# Patient Record
Sex: FEMALE | Race: WHITE | NOT HISPANIC OR LATINO | URBAN - METROPOLITAN AREA
[De-identification: names, ages, dates, MRNs, and addresses within clinical notes are randomized per-mention and may not be internally consistent; named-entity substitution may affect disease eponyms.]

---

## 2019-03-11 ENCOUNTER — INPATIENT HOSPITAL (OUTPATIENT)
Dept: URBAN - METROPOLITAN AREA HOSPITAL 93 | Facility: HOSPITAL | Age: 49
End: 2019-03-11
Payer: COMMERCIAL

## 2019-03-11 DIAGNOSIS — K25.0 ACUTE GASTRIC ULCER WITH HEMORRHAGE: ICD-10-CM

## 2019-03-11 DIAGNOSIS — D62 ACUTE POSTHEMORRHAGIC ANEMIA: ICD-10-CM

## 2019-03-11 DIAGNOSIS — K44.9 DIAPHRAGMATIC HERNIA WITHOUT OBSTRUCTION OR GANGRENE: ICD-10-CM

## 2019-03-11 DIAGNOSIS — K92.1 MELENA: ICD-10-CM

## 2019-03-11 DIAGNOSIS — K22.10 ULCER OF ESOPHAGUS WITHOUT BLEEDING: ICD-10-CM

## 2019-03-11 PROCEDURE — 99254 IP/OBS CNSLTJ NEW/EST MOD 60: CPT | Mod: 25 | Performed by: INTERNAL MEDICINE

## 2019-03-11 PROCEDURE — 43235 EGD DIAGNOSTIC BRUSH WASH: CPT | Performed by: INTERNAL MEDICINE

## 2019-03-12 PROCEDURE — 99232 SBSQ HOSP IP/OBS MODERATE 35: CPT | Performed by: INTERNAL MEDICINE

## 2020-01-16 ENCOUNTER — OFFICE (OUTPATIENT)
Dept: URBAN - METROPOLITAN AREA CLINIC 10 | Facility: CLINIC | Age: 50
End: 2020-01-16

## 2020-01-16 VITALS
HEART RATE: 97 BPM | DIASTOLIC BLOOD PRESSURE: 92 MMHG | SYSTOLIC BLOOD PRESSURE: 134 MMHG | WEIGHT: 191 LBS | HEIGHT: 61 IN

## 2020-01-16 DIAGNOSIS — R11.10 VOMITING, UNSPECIFIED: ICD-10-CM

## 2020-01-16 DIAGNOSIS — D50.9 IRON DEFICIENCY ANEMIA, UNSPECIFIED: ICD-10-CM

## 2020-01-16 DIAGNOSIS — K21.9 GASTRO-ESOPHAGEAL REFLUX DISEASE WITHOUT ESOPHAGITIS: ICD-10-CM

## 2020-01-16 PROCEDURE — 99214 OFFICE O/P EST MOD 30 MIN: CPT | Performed by: PHYSICIAN ASSISTANT

## 2020-01-16 RX ORDER — PANTOPRAZOLE SODIUM 40 MG/1
40 TABLET, DELAYED RELEASE ORAL
Refills: 0 | Status: COMPLETED
End: 2020-01-16 | Stop reason: ALTCHOICE

## 2020-01-16 RX ORDER — OMEPRAZOLE 40 MG/1
CAPSULE, DELAYED RELEASE ORAL
Qty: 90 | Refills: 3 | Status: ACTIVE

## 2020-01-16 NOTE — SERVICENOTES
[id number]fe-old with complaints of heartburn and reflux to the point of vomiting at night.  She denies unintentional weight loss or hematemesis.  She had a Kyle's lesion on EGD 1 year ago that was done for severe iron deficiency anemia.  Appears her hematocrit is now normal based on recent labs.  She has had a history of a lap band that was removed approximately 10-12 years ago for what sounds like complications of paraesophageal hernia. She has history of pyloric stenosis surgery as a child.  Agree with plan for omeprazole prior to 1st meal a day and prior to evening meal.  Would recommend consuming 4-6 small meals daily that are low in fat, not eating 2 hours before lying down, and considering using wedge-shaped bili to keep head of bed elevated.

## 2020-01-16 NOTE — SERVICEHPINOTES
Bianca Stockton   is a  50   year old  female  who is seen here today for a first visit. She is seen in consultation for  Yuni Ray  .    She is here today with c/o hiatal hernia, reflux, and vomiting. She notes past hx of severe reflux, iron deficiency anemia, and bleeding gastric ulcer. She was in the hospital early 2019 for anemia and received 2U PRBCs. She had EGD 3/2019 which showed Kyle ulcer and sliding hiatal hernia, otherwise normal. Labs reviewed from PCP, there's no current anemia last labs and iron studies were normal. She has been on pantoprazole for the past 2-3 months and sometimes takes Prilosec OTC with it with some improvement. She says her Dad had hiatal hernia with similar sxs and had surgery to repair it. She notes burning throat and some epigastric discomfort at times that comes and goes. She says that few nights per week she will wake up in the middle of the night and have to vomit "just liquid acid" of moderate amount. Denies any bloody emesis. She has not lost weight abnormally. No BM changes or blood in stool. She had colonoscopy in 2010.BR10/2019: Hematocrit 46.99/2019: Hematocrit 44.306/20/2019: CBC, serum iron, TIBC, and ferritin reported as [id number]/11/2019: EGD-sliding hiatal hernia Kyle ulcer the level of diaphragmatic esophagus stomach mucosa duodenal bulb and 2nd part of duodenum.4/16/2010: Colonoscopy and EGDBRColonoscopy-grade 1 internal hemorrhoidsBREGD-hyperplastic polyp of duodenumBR

## 2020-01-20 ENCOUNTER — AMBULATORY SURGICAL CENTER (OUTPATIENT)
Dept: URBAN - METROPOLITAN AREA SURGERY 1 | Facility: SURGERY | Age: 50
End: 2020-01-20
Payer: COMMERCIAL

## 2020-01-20 ENCOUNTER — AMBULATORY SURGICAL CENTER (OUTPATIENT)
Dept: URBAN - METROPOLITAN AREA SURGERY 1 | Facility: SURGERY | Age: 50
End: 2020-01-20

## 2020-01-20 ENCOUNTER — OFFICE (OUTPATIENT)
Dept: URBAN - METROPOLITAN AREA PATHOLOGY 22 | Facility: PATHOLOGY | Age: 50
End: 2020-01-20
Payer: COMMERCIAL

## 2020-01-20 VITALS
WEIGHT: 184 LBS | DIASTOLIC BLOOD PRESSURE: 55 MMHG | DIASTOLIC BLOOD PRESSURE: 59 MMHG | SYSTOLIC BLOOD PRESSURE: 95 MMHG | HEART RATE: 81 BPM | HEART RATE: 76 BPM | HEART RATE: 90 BPM | HEART RATE: 90 BPM | HEART RATE: 80 BPM | TEMPERATURE: 97.9 F | SYSTOLIC BLOOD PRESSURE: 130 MMHG | HEART RATE: 90 BPM | OXYGEN SATURATION: 100 % | SYSTOLIC BLOOD PRESSURE: 130 MMHG | SYSTOLIC BLOOD PRESSURE: 105 MMHG | DIASTOLIC BLOOD PRESSURE: 81 MMHG | TEMPERATURE: 99.6 F | DIASTOLIC BLOOD PRESSURE: 63 MMHG | SYSTOLIC BLOOD PRESSURE: 130 MMHG | OXYGEN SATURATION: 99 % | SYSTOLIC BLOOD PRESSURE: 105 MMHG | TEMPERATURE: 99.6 F | HEIGHT: 61 IN | DIASTOLIC BLOOD PRESSURE: 54 MMHG | HEART RATE: 85 BPM | OXYGEN SATURATION: 99 % | OXYGEN SATURATION: 99 % | DIASTOLIC BLOOD PRESSURE: 55 MMHG | RESPIRATION RATE: 16 BRPM | RESPIRATION RATE: 18 BRPM | SYSTOLIC BLOOD PRESSURE: 108 MMHG | HEART RATE: 76 BPM | RESPIRATION RATE: 18 BRPM | HEART RATE: 80 BPM | HEART RATE: 81 BPM | DIASTOLIC BLOOD PRESSURE: 55 MMHG | SYSTOLIC BLOOD PRESSURE: 99 MMHG | DIASTOLIC BLOOD PRESSURE: 59 MMHG | DIASTOLIC BLOOD PRESSURE: 54 MMHG | RESPIRATION RATE: 16 BRPM | SYSTOLIC BLOOD PRESSURE: 95 MMHG | RESPIRATION RATE: 18 BRPM | SYSTOLIC BLOOD PRESSURE: 95 MMHG | HEART RATE: 76 BPM | WEIGHT: 184 LBS | HEART RATE: 85 BPM | DIASTOLIC BLOOD PRESSURE: 63 MMHG | WEIGHT: 184 LBS | SYSTOLIC BLOOD PRESSURE: 105 MMHG | HEART RATE: 85 BPM | RESPIRATION RATE: 16 BRPM | DIASTOLIC BLOOD PRESSURE: 81 MMHG | TEMPERATURE: 97.9 F | TEMPERATURE: 97.9 F | DIASTOLIC BLOOD PRESSURE: 74 MMHG | DIASTOLIC BLOOD PRESSURE: 81 MMHG | HEIGHT: 61 IN | OXYGEN SATURATION: 100 % | SYSTOLIC BLOOD PRESSURE: 99 MMHG | SYSTOLIC BLOOD PRESSURE: 108 MMHG | DIASTOLIC BLOOD PRESSURE: 74 MMHG | TEMPERATURE: 99.6 F | HEART RATE: 80 BPM | DIASTOLIC BLOOD PRESSURE: 54 MMHG | DIASTOLIC BLOOD PRESSURE: 74 MMHG | SYSTOLIC BLOOD PRESSURE: 108 MMHG | DIASTOLIC BLOOD PRESSURE: 63 MMHG | SYSTOLIC BLOOD PRESSURE: 99 MMHG | OXYGEN SATURATION: 100 % | DIASTOLIC BLOOD PRESSURE: 59 MMHG | HEART RATE: 81 BPM | HEIGHT: 61 IN

## 2020-01-20 DIAGNOSIS — R11.10 VOMITING, UNSPECIFIED: ICD-10-CM

## 2020-01-20 DIAGNOSIS — K21.9 GASTRO-ESOPHAGEAL REFLUX DISEASE WITHOUT ESOPHAGITIS: ICD-10-CM

## 2020-01-20 DIAGNOSIS — K63.5 POLYP OF COLON: ICD-10-CM

## 2020-01-20 DIAGNOSIS — K64.1 SECOND DEGREE HEMORRHOIDS: ICD-10-CM

## 2020-01-20 DIAGNOSIS — Z12.11 ENCOUNTER FOR SCREENING FOR MALIGNANT NEOPLASM OF COLON: ICD-10-CM

## 2020-01-20 DIAGNOSIS — I10 ESSENTIAL (PRIMARY) HYPERTENSION: ICD-10-CM

## 2020-01-20 DIAGNOSIS — K44.9 DIAPHRAGMATIC HERNIA WITHOUT OBSTRUCTION OR GANGRENE: ICD-10-CM

## 2020-01-20 PROBLEM — D12.5 BENIGN NEOPLASM OF SIGMOID COLON: Status: ACTIVE | Noted: 2020-01-20

## 2020-01-20 PROCEDURE — 88305 TISSUE EXAM BY PATHOLOGIST: CPT | Performed by: INTERNAL MEDICINE

## 2020-01-20 PROCEDURE — 45380 COLONOSCOPY AND BIOPSY: CPT | Mod: 33 | Performed by: INTERNAL MEDICINE

## 2020-01-20 PROCEDURE — 43239 EGD BIOPSY SINGLE/MULTIPLE: CPT | Mod: 51 | Performed by: INTERNAL MEDICINE

## 2020-01-20 NOTE — SERVICEHPINOTES
50-year-old persistent reflux symptoms and vomiting at night with history of hiatal hernia and Kyle ulcers.  Also here for screening colonoscopy for average risk

## 2020-08-17 ENCOUNTER — OFFICE (OUTPATIENT)
Dept: URBAN - METROPOLITAN AREA CLINIC 10 | Facility: CLINIC | Age: 50
End: 2020-08-17

## 2020-08-17 VITALS
OXYGEN SATURATION: 99 % | WEIGHT: 187 LBS | DIASTOLIC BLOOD PRESSURE: 99 MMHG | HEIGHT: 61 IN | HEART RATE: 91 BPM | SYSTOLIC BLOOD PRESSURE: 153 MMHG

## 2020-08-17 DIAGNOSIS — K44.9 DIAPHRAGMATIC HERNIA WITHOUT OBSTRUCTION OR GANGRENE: ICD-10-CM

## 2020-08-17 DIAGNOSIS — R13.10 DYSPHAGIA, UNSPECIFIED: ICD-10-CM

## 2020-08-17 DIAGNOSIS — K21.9 GASTRO-ESOPHAGEAL REFLUX DISEASE WITHOUT ESOPHAGITIS: ICD-10-CM

## 2020-08-17 DIAGNOSIS — K92.0 HEMATEMESIS: ICD-10-CM

## 2020-08-17 LAB
CBC, PLATELET, NO DIFFERENTIAL: HEMATOCRIT: 40.6 % (ref 34–46.6)
CBC, PLATELET, NO DIFFERENTIAL: HEMOGLOBIN: 13 G/DL (ref 11.1–15.9)
CBC, PLATELET, NO DIFFERENTIAL: MCH: 29.1 PG (ref 26.6–33)
CBC, PLATELET, NO DIFFERENTIAL: MCHC: 32 G/DL (ref 31.5–35.7)
CBC, PLATELET, NO DIFFERENTIAL: MCV: 91 FL (ref 79–97)
CBC, PLATELET, NO DIFFERENTIAL: PLATELETS: 196 X10E3/UL (ref 150–450)
CBC, PLATELET, NO DIFFERENTIAL: RBC: 4.47 X10E6/UL (ref 3.77–5.28)
CBC, PLATELET, NO DIFFERENTIAL: RDW: 13.6 % (ref 11.7–15.4)
CBC, PLATELET, NO DIFFERENTIAL: WBC: 4.6 X10E3/UL (ref 3.4–10.8)
COMP. METABOLIC PANEL (14): A/G RATIO: 1.8 (ref 1.2–2.2)
COMP. METABOLIC PANEL (14): ALBUMIN: 4.5 G/DL (ref 3.8–4.8)
COMP. METABOLIC PANEL (14): ALKALINE PHOSPHATASE: 80 IU/L (ref 39–117)
COMP. METABOLIC PANEL (14): ALT (SGPT): 15 IU/L (ref 0–32)
COMP. METABOLIC PANEL (14): AST (SGOT): 15 IU/L (ref 0–40)
COMP. METABOLIC PANEL (14): BILIRUBIN, TOTAL: <0.2 MG/DL
COMP. METABOLIC PANEL (14): BUN/CREATININE RATIO: 20 (ref 9–23)
COMP. METABOLIC PANEL (14): BUN: 13 MG/DL (ref 6–24)
COMP. METABOLIC PANEL (14): CALCIUM: 9.2 MG/DL (ref 8.7–10.2)
COMP. METABOLIC PANEL (14): CARBON DIOXIDE, TOTAL: 21 MMOL/L (ref 20–29)
COMP. METABOLIC PANEL (14): CHLORIDE: 104 MMOL/L (ref 96–106)
COMP. METABOLIC PANEL (14): CREATININE: 0.64 MG/DL (ref 0.57–1)
COMP. METABOLIC PANEL (14): EGFR IF AFRICN AM: 120 ML/MIN/1.73 (ref 59–?)
COMP. METABOLIC PANEL (14): EGFR IF NONAFRICN AM: 104 ML/MIN/1.73 (ref 59–?)
COMP. METABOLIC PANEL (14): GLOBULIN, TOTAL: 2.5 G/DL (ref 1.5–4.5)
COMP. METABOLIC PANEL (14): GLUCOSE: 82 MG/DL (ref 65–99)
COMP. METABOLIC PANEL (14): POTASSIUM: 3.7 MMOL/L (ref 3.5–5.2)
COMP. METABOLIC PANEL (14): PROTEIN, TOTAL: 7 G/DL (ref 6–8.5)
COMP. METABOLIC PANEL (14): SODIUM: 141 MMOL/L (ref 134–144)

## 2020-08-17 PROCEDURE — 99214 OFFICE O/P EST MOD 30 MIN: CPT | Performed by: PHYSICIAN ASSISTANT

## 2020-08-17 RX ORDER — OMEPRAZOLE 40 MG/1
CAPSULE, DELAYED RELEASE ORAL
Qty: 90 | Refills: 3 | Status: ACTIVE

## 2020-08-17 NOTE — SERVICEHPINOTES
Bianca Stockton is a 50 year old female who is seen here today for a follow-up visit. She was last here for an EGD and colonoscopy in January. She had an upper GI in January also which showed large hiatal hernia. We were planning on sending her for pH impedance testing and esophageal motility testing.She hasn't had the tests as of yet. She has mentioned some vomiting and has noticed some blood in the emesis. She says vomiting occurs about 4-5x per month. It is getting worse. She says she feels "reflux so bad and if I bend over I vomit." This usually happens at night 3-4 hours after going to bed. She notes consistent burning in chest even on current meds. She notes epigastric pain that she describes as "a fist crunching in". Denies any NSAIDs. Drinks ETOH occasionally on weekends-perhaps one glass.  She also notes swallowing issues with foods like sandwiches and meats. Says it feels like they will get stuck in chest. She will have to wait a time for it to get better. 01/21/2020: Upper GI with air contrast-large hiatal hernia. No spontaneous GERD seen at fluoroscopy. No delay in passage of contrast or barium tablet from the esophagus into hernia or hernia into subdiaphragmatic stomach.01/20/2020: EGD and colonoscopyBREGD-hiatal hernia, negative biopsiesBRColonoscopy-negative biopsy, grade 2 internal hemorrhoids, grade 2 external [id number]/2019: Hematocrit 46.99/2019: Hematocrit 44.306/20/2019: CBC, serum iron, TIBC, and ferritin reported as [id number]/11/2019: EGD-sliding hiatal hernia Kyle ulcer the level of diaphragmatic esophagus stomach mucosa duodenal bulb and 2nd part of duodenum.4/16/2010: Colonoscopy and EGDBRColonoscopy-grade 1 internal hemorrhoidsBREGD-hyperplastic polyp of duodenum

## 2020-08-17 NOTE — SERVICENOTES
The patient's assessment was discussed face to face with Dr. Gómez and a collaborative plan of care was established.

## 2020-08-25 ENCOUNTER — AMBULATORY SURGICAL CENTER (OUTPATIENT)
Dept: URBAN - METROPOLITAN AREA SURGERY 1 | Facility: SURGERY | Age: 50
End: 2020-08-25

## 2020-08-25 VITALS
SYSTOLIC BLOOD PRESSURE: 119 MMHG | DIASTOLIC BLOOD PRESSURE: 79 MMHG | HEART RATE: 101 BPM | SYSTOLIC BLOOD PRESSURE: 117 MMHG | HEART RATE: 88 BPM | HEIGHT: 61 IN | HEART RATE: 91 BPM | RESPIRATION RATE: 20 BRPM | TEMPERATURE: 98.1 F | WEIGHT: 183 LBS | DIASTOLIC BLOOD PRESSURE: 69 MMHG | DIASTOLIC BLOOD PRESSURE: 95 MMHG | DIASTOLIC BLOOD PRESSURE: 87 MMHG | SYSTOLIC BLOOD PRESSURE: 116 MMHG | DIASTOLIC BLOOD PRESSURE: 87 MMHG | SYSTOLIC BLOOD PRESSURE: 158 MMHG | SYSTOLIC BLOOD PRESSURE: 117 MMHG | OXYGEN SATURATION: 100 % | RESPIRATION RATE: 16 BRPM | DIASTOLIC BLOOD PRESSURE: 95 MMHG | HEART RATE: 91 BPM | HEIGHT: 61 IN | DIASTOLIC BLOOD PRESSURE: 80 MMHG | RESPIRATION RATE: 18 BRPM | WEIGHT: 183 LBS | TEMPERATURE: 98.1 F | DIASTOLIC BLOOD PRESSURE: 69 MMHG | TEMPERATURE: 98 F | SYSTOLIC BLOOD PRESSURE: 158 MMHG | HEART RATE: 96 BPM | HEART RATE: 84 BPM | SYSTOLIC BLOOD PRESSURE: 115 MMHG | DIASTOLIC BLOOD PRESSURE: 80 MMHG | RESPIRATION RATE: 16 BRPM | TEMPERATURE: 98 F | SYSTOLIC BLOOD PRESSURE: 116 MMHG | SYSTOLIC BLOOD PRESSURE: 115 MMHG | HEART RATE: 96 BPM | HEART RATE: 84 BPM | SYSTOLIC BLOOD PRESSURE: 119 MMHG | RESPIRATION RATE: 18 BRPM | RESPIRATION RATE: 20 BRPM | HEART RATE: 101 BPM | OXYGEN SATURATION: 100 % | HEART RATE: 88 BPM | DIASTOLIC BLOOD PRESSURE: 79 MMHG

## 2020-08-25 DIAGNOSIS — K92.0 HEMATEMESIS: ICD-10-CM

## 2020-08-25 DIAGNOSIS — K44.9 DIAPHRAGMATIC HERNIA WITHOUT OBSTRUCTION OR GANGRENE: ICD-10-CM

## 2020-08-25 DIAGNOSIS — J45.909 UNSPECIFIED ASTHMA, UNCOMPLICATED: ICD-10-CM

## 2020-08-25 PROCEDURE — 43235 EGD DIAGNOSTIC BRUSH WASH: CPT | Performed by: INTERNAL MEDICINE

## 2021-01-05 ENCOUNTER — OFFICE (OUTPATIENT)
Dept: URBAN - METROPOLITAN AREA CLINIC 10 | Facility: CLINIC | Age: 51
End: 2021-01-05

## 2021-01-05 VITALS
SYSTOLIC BLOOD PRESSURE: 138 MMHG | HEIGHT: 61 IN | HEART RATE: 92 BPM | WEIGHT: 182 LBS | DIASTOLIC BLOOD PRESSURE: 80 MMHG

## 2021-01-05 DIAGNOSIS — K44.9 DIAPHRAGMATIC HERNIA WITHOUT OBSTRUCTION OR GANGRENE: ICD-10-CM

## 2021-01-05 DIAGNOSIS — R11.10 VOMITING, UNSPECIFIED: ICD-10-CM

## 2021-01-05 DIAGNOSIS — K21.9 GASTRO-ESOPHAGEAL REFLUX DISEASE WITHOUT ESOPHAGITIS: ICD-10-CM

## 2021-01-05 PROCEDURE — 99213 OFFICE O/P EST LOW 20 MIN: CPT | Performed by: PHYSICIAN ASSISTANT

## 2021-01-05 RX ORDER — OMEPRAZOLE 40 MG/1
CAPSULE, DELAYED RELEASE ORAL
Qty: 90 | Refills: 3 | Status: ACTIVE

## 2021-07-01 VITALS — HEIGHT: 61 IN

## 2021-07-06 ENCOUNTER — OFFICE (OUTPATIENT)
Dept: URBAN - METROPOLITAN AREA TELEHEALTH 11 | Facility: TELEHEALTH | Age: 51
End: 2021-07-06

## 2021-07-06 DIAGNOSIS — K44.9 DIAPHRAGMATIC HERNIA WITHOUT OBSTRUCTION OR GANGRENE: ICD-10-CM

## 2021-07-06 DIAGNOSIS — D50.9 IRON DEFICIENCY ANEMIA, UNSPECIFIED: ICD-10-CM

## 2021-07-06 PROCEDURE — 99441: CPT | Performed by: PHYSICIAN ASSISTANT

## 2021-07-06 RX ORDER — OMEPRAZOLE 40 MG/1
CAPSULE, DELAYED RELEASE ORAL
Qty: 90 | Refills: 3 | Status: ACTIVE

## 2021-07-06 NOTE — SERVICEHPINOTES
Ms. Stockton is 51 years old. She is here for follow-up of hiatal hernia with reflux. She was put on iron recently 2 weeks ago after PCP noticed anemia. She states her PCP asked her to follow up with us in regard to anemia as she does have a past history of Kyle ulcer in 2019. She denies any BRBPR. She has occasional emesis if she eats something that causes severe reflux. Denies any hematemesis. She reports she has not seen Dr. Mc still concerned about losing her job if she takes off for a surgery explaining many people have been terminated at her job already.  08/25/2020-EGD for hematemesis: Large hiatal hernia with hiatal narrowing at 39 centimeters and Z-line at 32 centimeter. Tortuous esophagus. Normal stomach other than large hiatal hernia.08/17/2020-CBC normal. CMP normal.01/21/2020: Upper GI with air contrast-large hiatal hernia. No spontaneous GERD seen at fluoroscopy. No delay in passage of contrast or barium tablet from the esophagus into hernia or hernia into subdiaphragmatic stomach.01/20/2020: EGD and colonoscopyBREGD-hiatal hernia, negative biopsiesBRColonoscopy-negative biopsy, grade 2 internal hemorrhoids, grade 2 external [id number]/2019: Hematocrit 46.99/2019: Hematocrit 44.306/20/2019: CBC, serum iron, TIBC, and ferritin reported as [id number]/11/2019: EGD-sliding hiatal hernia Kyle ulcer the level of diaphragmatic esophagus stomach mucosa duodenal bulb and 2nd part of duodenum.4/16/2010: Colonoscopy and EGDBRColonoscopy-grade 1 internal hemorrhoidsBREGD-hyperplastic polyp of duodenum 05/2008-colonoscopy for hematochezia that was limited by adherent common the right colon. 5 mm sigmoid tubular adenoma. Hemorrhoids.

## 2023-11-13 ENCOUNTER — OFFICE (OUTPATIENT)
Dept: URBAN - METROPOLITAN AREA PATHOLOGY 12 | Facility: PATHOLOGY | Age: 53
End: 2023-11-13
Payer: COMMERCIAL

## 2023-11-13 ENCOUNTER — AMBULATORY SURGICAL CENTER (OUTPATIENT)
Dept: URBAN - METROPOLITAN AREA SURGERY 1 | Facility: SURGERY | Age: 53
End: 2023-11-13

## 2023-11-13 VITALS
HEART RATE: 80 BPM | OXYGEN SATURATION: 91 % | RESPIRATION RATE: 20 BRPM | HEART RATE: 88 BPM | OXYGEN SATURATION: 100 % | DIASTOLIC BLOOD PRESSURE: 73 MMHG | TEMPERATURE: 98.6 F | SYSTOLIC BLOOD PRESSURE: 120 MMHG | RESPIRATION RATE: 20 BRPM | DIASTOLIC BLOOD PRESSURE: 79 MMHG | SYSTOLIC BLOOD PRESSURE: 123 MMHG | DIASTOLIC BLOOD PRESSURE: 79 MMHG | TEMPERATURE: 97.1 F | HEART RATE: 88 BPM | SYSTOLIC BLOOD PRESSURE: 112 MMHG | SYSTOLIC BLOOD PRESSURE: 146 MMHG | DIASTOLIC BLOOD PRESSURE: 65 MMHG | OXYGEN SATURATION: 100 % | HEART RATE: 80 BPM | HEART RATE: 80 BPM | HEART RATE: 89 BPM | RESPIRATION RATE: 20 BRPM | OXYGEN SATURATION: 91 % | DIASTOLIC BLOOD PRESSURE: 90 MMHG | HEIGHT: 61 IN | SYSTOLIC BLOOD PRESSURE: 119 MMHG | SYSTOLIC BLOOD PRESSURE: 119 MMHG | OXYGEN SATURATION: 100 % | DIASTOLIC BLOOD PRESSURE: 90 MMHG | RESPIRATION RATE: 16 BRPM | OXYGEN SATURATION: 91 % | HEART RATE: 79 BPM | HEART RATE: 89 BPM | DIASTOLIC BLOOD PRESSURE: 68 MMHG | SYSTOLIC BLOOD PRESSURE: 120 MMHG | SYSTOLIC BLOOD PRESSURE: 123 MMHG | HEART RATE: 82 BPM | DIASTOLIC BLOOD PRESSURE: 68 MMHG | DIASTOLIC BLOOD PRESSURE: 79 MMHG | WEIGHT: 153 LBS | WEIGHT: 153 LBS | DIASTOLIC BLOOD PRESSURE: 68 MMHG | HEART RATE: 89 BPM | RESPIRATION RATE: 18 BRPM | DIASTOLIC BLOOD PRESSURE: 65 MMHG | HEART RATE: 82 BPM | WEIGHT: 153 LBS | SYSTOLIC BLOOD PRESSURE: 119 MMHG | SYSTOLIC BLOOD PRESSURE: 120 MMHG | RESPIRATION RATE: 16 BRPM | SYSTOLIC BLOOD PRESSURE: 112 MMHG | HEART RATE: 79 BPM | TEMPERATURE: 98.6 F | HEART RATE: 79 BPM | TEMPERATURE: 97.1 F | HEIGHT: 61 IN | TEMPERATURE: 98.6 F | RESPIRATION RATE: 16 BRPM | TEMPERATURE: 97.1 F | DIASTOLIC BLOOD PRESSURE: 73 MMHG | HEIGHT: 61 IN | RESPIRATION RATE: 18 BRPM | SYSTOLIC BLOOD PRESSURE: 146 MMHG | SYSTOLIC BLOOD PRESSURE: 146 MMHG | SYSTOLIC BLOOD PRESSURE: 123 MMHG | RESPIRATION RATE: 18 BRPM | DIASTOLIC BLOOD PRESSURE: 65 MMHG | DIASTOLIC BLOOD PRESSURE: 90 MMHG | HEART RATE: 82 BPM | SYSTOLIC BLOOD PRESSURE: 112 MMHG | HEART RATE: 88 BPM | DIASTOLIC BLOOD PRESSURE: 73 MMHG

## 2023-11-13 DIAGNOSIS — K92.1 MELENA: ICD-10-CM

## 2023-11-13 DIAGNOSIS — K29.50 UNSPECIFIED CHRONIC GASTRITIS WITHOUT BLEEDING: ICD-10-CM

## 2023-11-13 DIAGNOSIS — K44.9 DIAPHRAGMATIC HERNIA WITHOUT OBSTRUCTION OR GANGRENE: ICD-10-CM

## 2023-11-13 DIAGNOSIS — R13.10 DYSPHAGIA, UNSPECIFIED: ICD-10-CM

## 2023-11-13 DIAGNOSIS — K31.89 OTHER DISEASES OF STOMACH AND DUODENUM: ICD-10-CM

## 2023-11-13 PROCEDURE — 88313 SPECIAL STAINS GROUP 2: CPT | Performed by: STUDENT IN AN ORGANIZED HEALTH CARE EDUCATION/TRAINING PROGRAM

## 2023-11-13 PROCEDURE — 43239 EGD BIOPSY SINGLE/MULTIPLE: CPT | Mod: 51 | Performed by: INTERNAL MEDICINE

## 2023-11-13 PROCEDURE — 88342 IMHCHEM/IMCYTCHM 1ST ANTB: CPT | Performed by: STUDENT IN AN ORGANIZED HEALTH CARE EDUCATION/TRAINING PROGRAM

## 2023-11-13 PROCEDURE — 45378 DIAGNOSTIC COLONOSCOPY: CPT | Performed by: INTERNAL MEDICINE

## 2023-11-13 PROCEDURE — 88305 TISSUE EXAM BY PATHOLOGIST: CPT | Performed by: STUDENT IN AN ORGANIZED HEALTH CARE EDUCATION/TRAINING PROGRAM

## 2023-11-21 LAB
GASTRO ONE PATHOLOGY: PDF REPORT: (no result)

## 2024-02-20 ENCOUNTER — ON CAMPUS - OUTPATIENT (OUTPATIENT)
Dept: URBAN - NONMETROPOLITAN AREA HOSPITAL 28 | Facility: HOSPITAL | Age: 54
End: 2024-02-20
Payer: COMMERCIAL

## 2024-02-20 DIAGNOSIS — K44.9 DIAPHRAGMATIC HERNIA WITHOUT OBSTRUCTION OR GANGRENE: ICD-10-CM

## 2024-02-20 DIAGNOSIS — R13.10 DYSPHAGIA, UNSPECIFIED: ICD-10-CM

## 2024-02-20 PROCEDURE — 91010 ESOPHAGUS MOTILITY STUDY: CPT | Mod: 26 | Performed by: INTERNAL MEDICINE

## 2024-05-16 ENCOUNTER — OFFICE (OUTPATIENT)
Dept: URBAN - METROPOLITAN AREA CLINIC 10 | Facility: CLINIC | Age: 54
End: 2024-05-16
Payer: COMMERCIAL

## 2024-05-16 VITALS
HEIGHT: 61 IN | SYSTOLIC BLOOD PRESSURE: 119 MMHG | HEART RATE: 81 BPM | WEIGHT: 147 LBS | DIASTOLIC BLOOD PRESSURE: 75 MMHG

## 2024-05-16 DIAGNOSIS — K44.9 DIAPHRAGMATIC HERNIA WITHOUT OBSTRUCTION OR GANGRENE: ICD-10-CM

## 2024-05-16 DIAGNOSIS — R13.10 DYSPHAGIA, UNSPECIFIED: ICD-10-CM

## 2024-05-16 DIAGNOSIS — K21.9 GASTRO-ESOPHAGEAL REFLUX DISEASE WITHOUT ESOPHAGITIS: ICD-10-CM

## 2024-05-16 PROCEDURE — 99214 OFFICE O/P EST MOD 30 MIN: CPT | Performed by: INTERNAL MEDICINE

## 2024-05-16 RX ORDER — OMEPRAZOLE 40 MG/1
CAPSULE, DELAYED RELEASE ORAL
Qty: 90 | Refills: 3 | Status: ACTIVE

## 2025-06-04 ENCOUNTER — OFFICE (OUTPATIENT)
Dept: URBAN - METROPOLITAN AREA CLINIC 10 | Facility: CLINIC | Age: 55
End: 2025-06-04
Payer: COMMERCIAL

## 2025-06-04 VITALS
WEIGHT: 149 LBS | DIASTOLIC BLOOD PRESSURE: 77 MMHG | SYSTOLIC BLOOD PRESSURE: 115 MMHG | OXYGEN SATURATION: 98 % | HEIGHT: 61 IN | HEART RATE: 81 BPM

## 2025-06-04 DIAGNOSIS — R14.0 ABDOMINAL DISTENSION (GASEOUS): ICD-10-CM

## 2025-06-04 DIAGNOSIS — K21.9 GASTRO-ESOPHAGEAL REFLUX DISEASE WITHOUT ESOPHAGITIS: ICD-10-CM

## 2025-06-04 DIAGNOSIS — K58.1 IRRITABLE BOWEL SYNDROME WITH CONSTIPATION: ICD-10-CM

## 2025-06-04 PROCEDURE — 99214 OFFICE O/P EST MOD 30 MIN: CPT | Performed by: NURSE PRACTITIONER

## 2025-06-04 RX ORDER — LINACLOTIDE 145 UG/1
CAPSULE, GELATIN COATED ORAL
Qty: 90 | Refills: 3 | Status: ACTIVE
Start: 2025-06-04

## 2025-06-04 NOTE — SERVICEHPINOTES
Ms. Stockton is 55years old. She is here for follow up with chief complaint abdominal bloating. She underwent a hiatal hernia repair last year, which required open gastropexy. Since the surgery, she has noticed a new abdominal bulge, suspected to be a hernia. The bulge varies in size, becoming more pronounced at times and softer at others. It is described as a 'big ball' and is sometimes visible through clothing. The bulge can be manually reduced but causes discomfort, including back pain and a sensation of internal disarray. She also describes a sensation of air moving in her abdomen. She states since hiatal hernia repair she has not having any problems with dysphagia. No odynophagia or hematemesis.Heartburn and reflux have worsened, despite previous improvement. She has resumed omeprazole, having tapered off after her reflux surgery, but it has not alleviated the bulge or symptoms. Constipation is significant, with infrequent bowel movements occurring two to three times a week, often as small pellets. She uses Dulcolax and Miralax, with inconsistent relief. There is a constant sensation of needing to defecate without relief. Her appetite is unchanged, but symptoms impact her daily life, causing periods of extreme discomfort. States there is abdominal pain associated with the need to have a bowel movement and is relieved thereafter. States this will occur 1-2 days of the week. Symptoms are less pronounced in the morning after lying down overnight. No bloody stools or  unintended weight loss.04/2024- Paraesophageal hernia repair with open [id number]/2024- marshmallow and bagel esophagram: Incomplete clearance of contrast from the distal esophagus and a few distal tertiary contractions. Barium coated marshmallows bagel required more than 2 esophageal wave stripping movements. Marshmallow cleared after for wave stripping movements. Bagel did not completely clear. Tertiary contractions were demonstrated.02/2024- esophageal manometry: manometry catheter does not traverse diaphragm normal LES relaxation. No O'Brien classification abnormality.11/2023-EGD: 10 cm hiatal hernia Causing EGJ angulation. Tortuous esophagus. Kyle's lesions with biopsies demonstrating hemosiderin deposition within macrophages. H pylori negative.br-colonoscopy for hematochezia: [id number]/25/2020-EGD for hematemesis: Large hiatal hernia with hiatal narrowing at 39 centimeters and Z-line at 32 centimeter. Tortuous esophagus. Normal stomach other than large hiatal hernia.08/17/2020-CBC normal. CMP normal.01/21/2020: Upper GI with air contrast-large hiatal hernia. No spontaneous GERD seen at fluoroscopy. No delay in passage of contrast or barium tablet from the esophagus into hernia or hernia into subdiaphragmatic stomach.01/20/2020: EGD and colonoscopybrEGD-hiatal hernia, negative biopsiesbrColonoscopy-negative biopsy, grade 2 internal hemorrhoids, grade 2 external [id number]/2019: Hematocrit 46.99/2019: Hematocrit 44.306/20/2019: CBC, serum iron, TIBC, and ferritin reported as [id number]/11/2019: EGD-sliding hiatal hernia Kyle ulcer the level of diaphragmatic esophagus stomach mucosa duodenal bulb and 2nd part of duodenum.4/16/2010: Colonoscopy and EGDbrColonoscopy-grade 1 internal hemorrhoidsbrEGD-hyperplastic polyp of jdbiowix97/2008-colonoscopy for hematochezia that was limited by adherent common the right colon. 5 mm sigmoid tubular adenoma. Hemorrhoids.